# Patient Record
Sex: FEMALE | Race: NATIVE HAWAIIAN OR OTHER PACIFIC ISLANDER
[De-identification: names, ages, dates, MRNs, and addresses within clinical notes are randomized per-mention and may not be internally consistent; named-entity substitution may affect disease eponyms.]

---

## 2019-01-08 ENCOUNTER — HOSPITAL ENCOUNTER (EMERGENCY)
Dept: HOSPITAL 47 - EC | Age: 29
Discharge: HOME | End: 2019-01-08
Payer: COMMERCIAL

## 2019-01-08 VITALS
HEART RATE: 86 BPM | TEMPERATURE: 98.4 F | SYSTOLIC BLOOD PRESSURE: 124 MMHG | RESPIRATION RATE: 20 BRPM | DIASTOLIC BLOOD PRESSURE: 79 MMHG

## 2019-01-08 DIAGNOSIS — Z91.040: ICD-10-CM

## 2019-01-08 DIAGNOSIS — Z97.5: ICD-10-CM

## 2019-01-08 DIAGNOSIS — N83.201: Primary | ICD-10-CM

## 2019-01-08 DIAGNOSIS — F17.200: ICD-10-CM

## 2019-01-08 LAB
PH UR: 6.5 [PH] (ref 5–8)
RBC UR QL: <1 /HPF (ref 0–5)
SP GR UR: 1.02 (ref 1–1.03)
SQUAMOUS UR QL AUTO: 1 /HPF (ref 0–4)
UROBILINOGEN UR QL STRIP: <2 MG/DL (ref ?–2)
WBC #/AREA URNS HPF: 4 /HPF (ref 0–5)

## 2019-01-08 PROCEDURE — 81025 URINE PREGNANCY TEST: CPT

## 2019-01-08 PROCEDURE — 81001 URINALYSIS AUTO W/SCOPE: CPT

## 2019-01-08 PROCEDURE — 93975 VASCULAR STUDY: CPT

## 2019-01-08 PROCEDURE — 76830 TRANSVAGINAL US NON-OB: CPT

## 2019-01-08 PROCEDURE — 99284 EMERGENCY DEPT VISIT MOD MDM: CPT

## 2019-01-08 NOTE — ED
Female Urogenital HPI





- General


Chief complaint: Vaginal Bleeding


Stated complaint: Vaginal Bleeding


Source: patient


Mode of arrival: ambulatory


Limitations: no limitations





- History of Present Illness


Initial comments: 


Chronic is a 28-year-old female who presents to the emergency department today 

for evaluation of vaginal bleeding and pressure-like pelvic pain.  Patient 

reports that she has an IUD in place.  She reports that she had a different IUD 

in place before this one but it eroded into her endometrial wall causing 

chronic pain and had to be removed.  Patient reports that she had this on 

placed approximately 6 years ago she never had an ultrasound to confirm 

placement.  Patient reports that in the first year after having is simply she 

had regular periods every month however recently she's been having irregular 

bleeding and frequent spotting.  Patient reports that due to insurance problems 

she's been unable to follow-up however she has a new job and is nearly at her 

probationary period and she will have insurance soon and able to follow up with 

OB.





Last Menstrual Period: 12/25/18





- Related Data


 Allergies











Allergy/AdvReac Type Severity Reaction Status Date / Time


 


latex AdvReac  Swelling Verified 01/08/19 01:58














Review of Systems


ROS Statement: 


Those systems with pertinent positive or pertinent negative responses have been 

documented in the HPI.





ROS Other: All systems not noted in ROS Statement are negative.





Past Medical History


Additional Past Medical History / Comment(s): anemia


History of Any Multi-Drug Resistant Organisms: None Reported


Past Surgical History: No Surgical Hx Reported


Past Psychological History: No Psychological Hx Reported


Smoking Status: Current every day smoker


Past Alcohol Use History: Rare


Past Drug Use History: Marijuana





General Exam





- General Exam Comments


Initial Comments: 


Physical Exam


GENERAL:


Patient is well-developed and well-nourished.  


Patient is nontoxic and well-hydrated and is in no distress.





HENT:


Normocephalic, Atraumatic. 





EYES:


PERRL, EOMI





PULMONARY:


Unlabored respirations.





CARDIOVASCULAR:


Well perfused extremities





ABDOMEN:


Soft and nontender with normal bowel sounds. 





SKIN:


Skin is clear with no lesions or rashes and otherwise unremarkable.





: 


Deferred





NEUROLOGIC:


Patient is alert and oriented x3.  Moving all extremities spontaneously





MUSCULOSKELETAL:


Normal extremities with adequate strength and full range of motion.  No lower 

extremity swelling or edema.  No calf tenderness.  





PSYCHIATRIC:


Normal psychiatric evaluation.  





Limitations: no limitations





Limitations: no limitations





Course


 Vital Signs











  01/08/19 01/08/19





  01:52 05:21


 


Temperature 98.3 F 98.4 F


 


Pulse Rate 93 86


 


Respiratory 18 20





Rate  


 


Blood Pressure 129/90 124/79


 


O2 Sat by Pulse 100 97





Oximetry  














Medical Decision Making





- Medical Decision Making


Patient was seen and evaluated history was obtained from the patient


Urinalysis and ultrasound were ordered





Urinalysis with gross contamination pregnancy negative


Ultrasound with a IUD in good position however there is a complex right ovarian 

cysts which could be hemorrhagic in nature





These results were discussed with the patient who expresses understanding and 

relief that the IUD appears to be in good position.  Patient denies any 

concerns for sexual transmitted infection she has no vaginal discharge.  She 

defines pelvic exam





At this time patient comfortable with plan for discharge home, supportive care 

with anti-inflammatories patient will be provided a work note for missing work 

tonight.  Return parameters discussed all questions pertaining care answered.  

Discharged home in stable condition.








- Lab Data


 Lab Results











  01/08/19 01/08/19 Range/Units





  03:29 03:29 


 


Urine Color   Yellow  


 


Urine Appearance   Clear  (Clear)  


 


Urine pH   6.5  (5.0-8.0)  


 


Ur Specific Gravity   1.024  (1.001-1.035)  


 


Urine Protein   Trace H  (Negative)  


 


Urine Glucose (UA)   Negative  (Negative)  


 


Urine Ketones   Negative  (Negative)  


 


Urine Blood   Trace H  (Negative)  


 


Urine Nitrite   Negative  (Negative)  


 


Urine Bilirubin   Negative  (Negative)  


 


Urine Urobilinogen   <2.0  (<2.0)  mg/dL


 


Ur Leukocyte Esterase   Trace H  (Negative)  


 


Urine RBC   <1  (0-5)  /hpf


 


Urine WBC   4  (0-5)  /hpf


 


Ur Squamous Epith Cells   1  (0-4)  /hpf


 


Urine Mucus   Rare H  (None)  /hpf


 


Urine HCG, Qual  Not Detected   (Not Detectd)  














Disposition


Clinical Impression: 


 Right ovarian cyst





Disposition: HOME SELF-CARE


Condition: Good


Instructions:  Ovarian Cyst (ED)


Is patient prescribed a controlled substance at d/c from ED?: No


Referrals: 


Lawanda Kimble DO [Primary Care Provider] - 1-2 days


Time of Disposition: 05:09

## 2019-01-08 NOTE — US
EXAMINATION TYPE: US transvaginal

 

DATE OF EXAM: 1/8/2019

 

COMPARISON: NONE

 

CLINICAL HISTORY: pain, bleeding, IUD in place. IUD placement.

 

TECHNIQUE:  Transvaginal (TV).  

 

 

 

EXAM MEASUREMENTS:

 

Uterus:  7.7 x 3.4 x 4.7  cm

Endometrial Stripe: 1.1 cm

Right Ovary:  3.9 x 3.0 x 2.4  cm

Left Ovary:  2.7 x 1.4 x 1.4  cm

 

 

 

1. Uterus:  Anteverted   wnl

2. Endometrium:  IUD appears to be in place.

3. Right Ovary:  Complex area seen 1.9 x 1.8 x 1.7cm. 

4. Left Ovary:  wnl

**Spectral, color and waveform doppler imaging shows good arterial and venous flow within the ovaries
; there is no evidence for ovarian torsion.

5. Bilateral Adnexa:  wnl

6. Posterior cul-de-sac:  Fluid seen.

 

IUD appears to be in place.

 

IMPRESSION: IUD appears in good position. Minimal free fluid in the cul-de-sac. Complex cyst on the r
ight ovary. The possibility of ectopic pregnancy cannot be entirely excluded.

 

No evidence of ovarian torsion.